# Patient Record
Sex: MALE | Race: WHITE | NOT HISPANIC OR LATINO | URBAN - METROPOLITAN AREA
[De-identification: names, ages, dates, MRNs, and addresses within clinical notes are randomized per-mention and may not be internally consistent; named-entity substitution may affect disease eponyms.]

---

## 2021-01-15 ENCOUNTER — OFFICE VISIT (OUTPATIENT)
Dept: INTERNAL MEDICINE | Facility: CLINIC | Age: 46
End: 2021-01-15
Payer: COMMERCIAL

## 2021-01-15 VITALS
BODY MASS INDEX: 32.07 KG/M2 | HEART RATE: 74 BPM | HEIGHT: 77 IN | WEIGHT: 271.63 LBS | OXYGEN SATURATION: 98 % | TEMPERATURE: 97 F | RESPIRATION RATE: 16 BRPM | SYSTOLIC BLOOD PRESSURE: 128 MMHG | DIASTOLIC BLOOD PRESSURE: 84 MMHG

## 2021-01-15 DIAGNOSIS — M25.542 ARTHRALGIA OF BOTH HANDS: ICD-10-CM

## 2021-01-15 DIAGNOSIS — J30.2 SEASONAL ALLERGIES: ICD-10-CM

## 2021-01-15 DIAGNOSIS — Z76.89 ESTABLISHING CARE WITH NEW DOCTOR, ENCOUNTER FOR: Primary | ICD-10-CM

## 2021-01-15 DIAGNOSIS — M79.642 PAIN IN BOTH HANDS: ICD-10-CM

## 2021-01-15 DIAGNOSIS — M79.641 PAIN IN BOTH HANDS: ICD-10-CM

## 2021-01-15 DIAGNOSIS — Z13.220 SCREENING FOR LIPID DISORDERS: ICD-10-CM

## 2021-01-15 DIAGNOSIS — Z12.5 SCREENING FOR PROSTATE CANCER: ICD-10-CM

## 2021-01-15 DIAGNOSIS — M25.541 ARTHRALGIA OF BOTH HANDS: ICD-10-CM

## 2021-01-15 PROCEDURE — 3008F BODY MASS INDEX DOCD: CPT | Mod: CPTII,S$GLB,, | Performed by: NURSE PRACTITIONER

## 2021-01-15 PROCEDURE — 1125F AMNT PAIN NOTED PAIN PRSNT: CPT | Mod: S$GLB,,, | Performed by: NURSE PRACTITIONER

## 2021-01-15 PROCEDURE — 3008F PR BODY MASS INDEX (BMI) DOCUMENTED: ICD-10-PCS | Mod: CPTII,S$GLB,, | Performed by: NURSE PRACTITIONER

## 2021-01-15 PROCEDURE — 99214 PR OFFICE/OUTPT VISIT, EST, LEVL IV, 30-39 MIN: ICD-10-PCS | Mod: S$GLB,,, | Performed by: NURSE PRACTITIONER

## 2021-01-15 PROCEDURE — 1125F PR PAIN SEVERITY QUANTIFIED, PAIN PRESENT: ICD-10-PCS | Mod: S$GLB,,, | Performed by: NURSE PRACTITIONER

## 2021-01-15 PROCEDURE — 99999 PR PBB SHADOW E&M-NEW PATIENT-LVL III: ICD-10-PCS | Mod: PBBFAC,,, | Performed by: NURSE PRACTITIONER

## 2021-01-15 PROCEDURE — 99214 OFFICE O/P EST MOD 30 MIN: CPT | Mod: S$GLB,,, | Performed by: NURSE PRACTITIONER

## 2021-01-15 PROCEDURE — 99999 PR PBB SHADOW E&M-NEW PATIENT-LVL III: CPT | Mod: PBBFAC,,, | Performed by: NURSE PRACTITIONER

## 2021-01-15 RX ORDER — MELOXICAM 15 MG/1
15 TABLET ORAL DAILY
Qty: 30 TABLET | Refills: 0 | Status: SHIPPED | OUTPATIENT
Start: 2021-01-15 | End: 2021-03-14 | Stop reason: SDUPTHER

## 2021-01-23 ENCOUNTER — LAB VISIT (OUTPATIENT)
Dept: LAB | Facility: HOSPITAL | Age: 46
End: 2021-01-23
Attending: NURSE PRACTITIONER
Payer: COMMERCIAL

## 2021-01-23 DIAGNOSIS — Z12.5 SCREENING FOR PROSTATE CANCER: ICD-10-CM

## 2021-01-23 DIAGNOSIS — Z13.220 SCREENING FOR LIPID DISORDERS: ICD-10-CM

## 2021-01-23 DIAGNOSIS — Z76.89 ESTABLISHING CARE WITH NEW DOCTOR, ENCOUNTER FOR: ICD-10-CM

## 2021-01-23 LAB
ALBUMIN SERPL BCP-MCNC: 4.2 G/DL (ref 3.5–5.2)
ALP SERPL-CCNC: 64 U/L (ref 55–135)
ALT SERPL W/O P-5'-P-CCNC: 29 U/L (ref 10–44)
ANION GAP SERPL CALC-SCNC: 7 MMOL/L (ref 8–16)
AST SERPL-CCNC: 28 U/L (ref 10–40)
BASOPHILS # BLD AUTO: 0.04 K/UL (ref 0–0.2)
BASOPHILS NFR BLD: 0.8 % (ref 0–1.9)
BILIRUB SERPL-MCNC: 0.5 MG/DL (ref 0.1–1)
BUN SERPL-MCNC: 22 MG/DL (ref 6–20)
CALCIUM SERPL-MCNC: 9.6 MG/DL (ref 8.7–10.5)
CHLORIDE SERPL-SCNC: 109 MMOL/L (ref 95–110)
CHOLEST SERPL-MCNC: 156 MG/DL (ref 120–199)
CHOLEST/HDLC SERPL: 2.9 {RATIO} (ref 2–5)
CO2 SERPL-SCNC: 27 MMOL/L (ref 23–29)
COMPLEXED PSA SERPL-MCNC: 0.28 NG/ML (ref 0–4)
CREAT SERPL-MCNC: 1.1 MG/DL (ref 0.5–1.4)
DIFFERENTIAL METHOD: NORMAL
EOSINOPHIL # BLD AUTO: 0.2 K/UL (ref 0–0.5)
EOSINOPHIL NFR BLD: 3.4 % (ref 0–8)
ERYTHROCYTE [DISTWIDTH] IN BLOOD BY AUTOMATED COUNT: 13.5 % (ref 11.5–14.5)
EST. GFR  (AFRICAN AMERICAN): >60 ML/MIN/1.73 M^2
EST. GFR  (NON AFRICAN AMERICAN): >60 ML/MIN/1.73 M^2
GLUCOSE SERPL-MCNC: 104 MG/DL (ref 70–110)
HCT VFR BLD AUTO: 45.4 % (ref 40–54)
HDLC SERPL-MCNC: 53 MG/DL (ref 40–75)
HDLC SERPL: 34 % (ref 20–50)
HGB BLD-MCNC: 14.6 G/DL (ref 14–18)
IMM GRANULOCYTES # BLD AUTO: 0.01 K/UL (ref 0–0.04)
IMM GRANULOCYTES NFR BLD AUTO: 0.2 % (ref 0–0.5)
LDLC SERPL CALC-MCNC: 92.4 MG/DL (ref 63–159)
LYMPHOCYTES # BLD AUTO: 1.1 K/UL (ref 1–4.8)
LYMPHOCYTES NFR BLD: 21.4 % (ref 18–48)
MCH RBC QN AUTO: 29.3 PG (ref 27–31)
MCHC RBC AUTO-ENTMCNC: 32.2 G/DL (ref 32–36)
MCV RBC AUTO: 91 FL (ref 82–98)
MONOCYTES # BLD AUTO: 0.4 K/UL (ref 0.3–1)
MONOCYTES NFR BLD: 8.9 % (ref 4–15)
NEUTROPHILS # BLD AUTO: 3.2 K/UL (ref 1.8–7.7)
NEUTROPHILS NFR BLD: 65.3 % (ref 38–73)
NONHDLC SERPL-MCNC: 103 MG/DL
NRBC BLD-RTO: 0 /100 WBC
PLATELET # BLD AUTO: 283 K/UL (ref 150–350)
PMV BLD AUTO: 10.2 FL (ref 9.2–12.9)
POTASSIUM SERPL-SCNC: 5 MMOL/L (ref 3.5–5.1)
PROT SERPL-MCNC: 7.3 G/DL (ref 6–8.4)
RBC # BLD AUTO: 4.99 M/UL (ref 4.6–6.2)
SODIUM SERPL-SCNC: 143 MMOL/L (ref 136–145)
T4 FREE SERPL-MCNC: 1.01 NG/DL (ref 0.71–1.51)
TRIGL SERPL-MCNC: 53 MG/DL (ref 30–150)
TSH SERPL DL<=0.005 MIU/L-ACNC: 0.83 UIU/ML (ref 0.4–4)
WBC # BLD AUTO: 4.95 K/UL (ref 3.9–12.7)

## 2021-01-23 PROCEDURE — 84153 ASSAY OF PSA TOTAL: CPT

## 2021-01-23 PROCEDURE — 84443 ASSAY THYROID STIM HORMONE: CPT

## 2021-01-23 PROCEDURE — 84439 ASSAY OF FREE THYROXINE: CPT

## 2021-01-23 PROCEDURE — 80053 COMPREHEN METABOLIC PANEL: CPT

## 2021-01-23 PROCEDURE — 36415 COLL VENOUS BLD VENIPUNCTURE: CPT | Mod: PO

## 2021-01-23 PROCEDURE — 85025 COMPLETE CBC W/AUTO DIFF WBC: CPT

## 2021-01-23 PROCEDURE — 80061 LIPID PANEL: CPT

## 2021-01-25 ENCOUNTER — PATIENT MESSAGE (OUTPATIENT)
Dept: INTERNAL MEDICINE | Facility: CLINIC | Age: 46
End: 2021-01-25

## 2021-04-13 ENCOUNTER — PATIENT MESSAGE (OUTPATIENT)
Dept: RESEARCH | Facility: HOSPITAL | Age: 46
End: 2021-04-13

## 2021-04-30 ENCOUNTER — OFFICE VISIT (OUTPATIENT)
Dept: INTERNAL MEDICINE | Facility: CLINIC | Age: 46
End: 2021-04-30
Payer: COMMERCIAL

## 2021-04-30 VITALS
HEIGHT: 77 IN | DIASTOLIC BLOOD PRESSURE: 80 MMHG | BODY MASS INDEX: 32.3 KG/M2 | OXYGEN SATURATION: 98 % | HEART RATE: 83 BPM | TEMPERATURE: 98 F | WEIGHT: 273.56 LBS | SYSTOLIC BLOOD PRESSURE: 120 MMHG

## 2021-04-30 DIAGNOSIS — M54.2 NECK PAIN: ICD-10-CM

## 2021-04-30 DIAGNOSIS — Z76.89 ENCOUNTER TO ESTABLISH CARE: ICD-10-CM

## 2021-04-30 DIAGNOSIS — Z00.00 ANNUAL PHYSICAL EXAM: Primary | ICD-10-CM

## 2021-04-30 PROCEDURE — 1126F PR PAIN SEVERITY QUANTIFIED, NO PAIN PRESENT: ICD-10-PCS | Mod: S$GLB,,, | Performed by: INTERNAL MEDICINE

## 2021-04-30 PROCEDURE — 3008F PR BODY MASS INDEX (BMI) DOCUMENTED: ICD-10-PCS | Mod: CPTII,S$GLB,, | Performed by: INTERNAL MEDICINE

## 2021-04-30 PROCEDURE — 99999 PR PBB SHADOW E&M-EST. PATIENT-LVL III: ICD-10-PCS | Mod: PBBFAC,,, | Performed by: INTERNAL MEDICINE

## 2021-04-30 PROCEDURE — 99999 PR PBB SHADOW E&M-EST. PATIENT-LVL III: CPT | Mod: PBBFAC,,, | Performed by: INTERNAL MEDICINE

## 2021-04-30 PROCEDURE — 3008F BODY MASS INDEX DOCD: CPT | Mod: CPTII,S$GLB,, | Performed by: INTERNAL MEDICINE

## 2021-04-30 PROCEDURE — 99396 PR PREVENTIVE VISIT,EST,40-64: ICD-10-PCS | Mod: S$GLB,,, | Performed by: INTERNAL MEDICINE

## 2021-04-30 PROCEDURE — 1126F AMNT PAIN NOTED NONE PRSNT: CPT | Mod: S$GLB,,, | Performed by: INTERNAL MEDICINE

## 2021-04-30 PROCEDURE — 99396 PREV VISIT EST AGE 40-64: CPT | Mod: S$GLB,,, | Performed by: INTERNAL MEDICINE

## 2021-04-30 RX ORDER — GABAPENTIN 600 MG/1
600 TABLET ORAL 3 TIMES DAILY PRN
Qty: 270 TABLET | Refills: 1 | Status: SHIPPED | OUTPATIENT
Start: 2021-04-30 | End: 2023-03-03 | Stop reason: SDUPTHER

## 2021-04-30 RX ORDER — NICOTINE 10 MG/ML
SPRAY, METERED NASAL
COMMUNITY
End: 2022-06-06 | Stop reason: SDUPTHER

## 2021-04-30 RX ORDER — TRIAMCINOLONE ACETONIDE 1 MG/G
CREAM TOPICAL
COMMUNITY

## 2021-04-30 RX ORDER — GABAPENTIN 600 MG/1
600 TABLET ORAL 3 TIMES DAILY PRN
COMMUNITY
End: 2021-04-30 | Stop reason: SDUPTHER

## 2021-10-02 DIAGNOSIS — M25.541 ARTHRALGIA OF BOTH HANDS: ICD-10-CM

## 2021-10-02 DIAGNOSIS — M79.642 PAIN IN BOTH HANDS: ICD-10-CM

## 2021-10-02 DIAGNOSIS — M25.542 ARTHRALGIA OF BOTH HANDS: ICD-10-CM

## 2021-10-02 DIAGNOSIS — M79.641 PAIN IN BOTH HANDS: ICD-10-CM

## 2021-10-04 DIAGNOSIS — M79.642 PAIN IN BOTH HANDS: ICD-10-CM

## 2021-10-04 DIAGNOSIS — M25.541 ARTHRALGIA OF BOTH HANDS: ICD-10-CM

## 2021-10-04 DIAGNOSIS — M25.542 ARTHRALGIA OF BOTH HANDS: ICD-10-CM

## 2021-10-04 DIAGNOSIS — M79.641 PAIN IN BOTH HANDS: ICD-10-CM

## 2021-10-04 RX ORDER — MELOXICAM 15 MG/1
15 TABLET ORAL DAILY
Qty: 30 TABLET | Refills: 0 | Status: SHIPPED | OUTPATIENT
Start: 2021-10-04 | End: 2021-10-04 | Stop reason: SDUPTHER

## 2021-10-05 RX ORDER — MELOXICAM 15 MG/1
15 TABLET ORAL DAILY
Qty: 30 TABLET | Refills: 0 | Status: SHIPPED | OUTPATIENT
Start: 2021-10-05 | End: 2022-01-10

## 2022-04-01 DIAGNOSIS — M79.641 PAIN IN BOTH HANDS: ICD-10-CM

## 2022-04-01 DIAGNOSIS — M25.542 ARTHRALGIA OF BOTH HANDS: ICD-10-CM

## 2022-04-01 DIAGNOSIS — M79.642 PAIN IN BOTH HANDS: ICD-10-CM

## 2022-04-01 DIAGNOSIS — M25.541 ARTHRALGIA OF BOTH HANDS: ICD-10-CM

## 2022-04-01 RX ORDER — MELOXICAM 15 MG/1
15 TABLET ORAL DAILY
Qty: 30 TABLET | Refills: 0 | Status: SHIPPED | OUTPATIENT
Start: 2022-04-01 | End: 2022-04-26

## 2022-04-12 ENCOUNTER — PATIENT MESSAGE (OUTPATIENT)
Dept: INTERNAL MEDICINE | Facility: CLINIC | Age: 47
End: 2022-04-12
Payer: COMMERCIAL

## 2022-04-26 DIAGNOSIS — M79.642 PAIN IN BOTH HANDS: ICD-10-CM

## 2022-04-26 DIAGNOSIS — M25.542 ARTHRALGIA OF BOTH HANDS: ICD-10-CM

## 2022-04-26 DIAGNOSIS — M25.541 ARTHRALGIA OF BOTH HANDS: ICD-10-CM

## 2022-04-26 DIAGNOSIS — M79.641 PAIN IN BOTH HANDS: ICD-10-CM

## 2022-04-26 RX ORDER — MELOXICAM 15 MG/1
15 TABLET ORAL DAILY PRN
Qty: 30 TABLET | Refills: 0 | Status: SHIPPED | OUTPATIENT
Start: 2022-04-26 | End: 2022-05-26

## 2022-05-26 DIAGNOSIS — M79.641 PAIN IN BOTH HANDS: ICD-10-CM

## 2022-05-26 DIAGNOSIS — M79.642 PAIN IN BOTH HANDS: ICD-10-CM

## 2022-05-26 DIAGNOSIS — M25.541 ARTHRALGIA OF BOTH HANDS: ICD-10-CM

## 2022-05-26 DIAGNOSIS — M25.542 ARTHRALGIA OF BOTH HANDS: ICD-10-CM

## 2022-05-26 RX ORDER — MELOXICAM 15 MG/1
TABLET ORAL
Qty: 30 TABLET | Refills: 0 | Status: SHIPPED | OUTPATIENT
Start: 2022-05-26 | End: 2022-07-19

## 2022-06-06 ENCOUNTER — OFFICE VISIT (OUTPATIENT)
Dept: INTERNAL MEDICINE | Facility: CLINIC | Age: 47
End: 2022-06-06
Payer: COMMERCIAL

## 2022-06-06 ENCOUNTER — LAB VISIT (OUTPATIENT)
Dept: LAB | Facility: HOSPITAL | Age: 47
End: 2022-06-06
Attending: NURSE PRACTITIONER
Payer: COMMERCIAL

## 2022-06-06 VITALS
SYSTOLIC BLOOD PRESSURE: 138 MMHG | TEMPERATURE: 98 F | HEART RATE: 69 BPM | DIASTOLIC BLOOD PRESSURE: 86 MMHG | OXYGEN SATURATION: 98 % | BODY MASS INDEX: 32.52 KG/M2 | WEIGHT: 274.25 LBS

## 2022-06-06 DIAGNOSIS — Z13.220 ENCOUNTER FOR LIPID SCREENING FOR CARDIOVASCULAR DISEASE: ICD-10-CM

## 2022-06-06 DIAGNOSIS — Z13.6 ENCOUNTER FOR LIPID SCREENING FOR CARDIOVASCULAR DISEASE: ICD-10-CM

## 2022-06-06 DIAGNOSIS — J30.2 SEASONAL ALLERGIES: ICD-10-CM

## 2022-06-06 DIAGNOSIS — Z11.59 ENCOUNTER FOR HEPATITIS C VIRUS SCREENING TEST FOR HIGH RISK PATIENT: ICD-10-CM

## 2022-06-06 DIAGNOSIS — M54.31 SCIATICA OF RIGHT SIDE: ICD-10-CM

## 2022-06-06 DIAGNOSIS — Z00.00 ANNUAL PHYSICAL EXAM: Primary | ICD-10-CM

## 2022-06-06 DIAGNOSIS — Z12.5 SCREENING FOR PROSTATE CANCER: ICD-10-CM

## 2022-06-06 DIAGNOSIS — M54.2 NECK PAIN: ICD-10-CM

## 2022-06-06 DIAGNOSIS — Z91.89 ENCOUNTER FOR HEPATITIS C VIRUS SCREENING TEST FOR HIGH RISK PATIENT: ICD-10-CM

## 2022-06-06 DIAGNOSIS — Z00.00 ANNUAL PHYSICAL EXAM: ICD-10-CM

## 2022-06-06 DIAGNOSIS — Z11.59 ENCOUNTER FOR HEPATITIS C SCREENING TEST FOR LOW RISK PATIENT: ICD-10-CM

## 2022-06-06 LAB
ALBUMIN SERPL BCP-MCNC: 4.3 G/DL (ref 3.5–5.2)
ALP SERPL-CCNC: 64 U/L (ref 55–135)
ALT SERPL W/O P-5'-P-CCNC: 22 U/L (ref 10–44)
ANION GAP SERPL CALC-SCNC: 9 MMOL/L (ref 8–16)
AST SERPL-CCNC: 20 U/L (ref 10–40)
BASOPHILS # BLD AUTO: 0.04 K/UL (ref 0–0.2)
BASOPHILS NFR BLD: 0.8 % (ref 0–1.9)
BILIRUB SERPL-MCNC: 0.6 MG/DL (ref 0.1–1)
BUN SERPL-MCNC: 16 MG/DL (ref 6–20)
CALCIUM SERPL-MCNC: 9.5 MG/DL (ref 8.7–10.5)
CHLORIDE SERPL-SCNC: 106 MMOL/L (ref 95–110)
CHOLEST SERPL-MCNC: 160 MG/DL (ref 120–199)
CHOLEST/HDLC SERPL: 3.1 {RATIO} (ref 2–5)
CO2 SERPL-SCNC: 26 MMOL/L (ref 23–29)
COMPLEXED PSA SERPL-MCNC: 0.46 NG/ML (ref 0–4)
CREAT SERPL-MCNC: 1 MG/DL (ref 0.5–1.4)
DIFFERENTIAL METHOD: NORMAL
EOSINOPHIL # BLD AUTO: 0.1 K/UL (ref 0–0.5)
EOSINOPHIL NFR BLD: 2 % (ref 0–8)
ERYTHROCYTE [DISTWIDTH] IN BLOOD BY AUTOMATED COUNT: 13.3 % (ref 11.5–14.5)
EST. GFR  (AFRICAN AMERICAN): >60 ML/MIN/1.73 M^2
EST. GFR  (NON AFRICAN AMERICAN): >60 ML/MIN/1.73 M^2
GLUCOSE SERPL-MCNC: 92 MG/DL (ref 70–110)
HCT VFR BLD AUTO: 44.1 % (ref 40–54)
HDLC SERPL-MCNC: 51 MG/DL (ref 40–75)
HDLC SERPL: 31.9 % (ref 20–50)
HGB BLD-MCNC: 14.9 G/DL (ref 14–18)
IMM GRANULOCYTES # BLD AUTO: 0.01 K/UL (ref 0–0.04)
IMM GRANULOCYTES NFR BLD AUTO: 0.2 % (ref 0–0.5)
LDLC SERPL CALC-MCNC: 96.8 MG/DL (ref 63–159)
LYMPHOCYTES # BLD AUTO: 1.3 K/UL (ref 1–4.8)
LYMPHOCYTES NFR BLD: 27.3 % (ref 18–48)
MCH RBC QN AUTO: 29.2 PG (ref 27–31)
MCHC RBC AUTO-ENTMCNC: 33.8 G/DL (ref 32–36)
MCV RBC AUTO: 87 FL (ref 82–98)
MONOCYTES # BLD AUTO: 0.4 K/UL (ref 0.3–1)
MONOCYTES NFR BLD: 7.2 % (ref 4–15)
NEUTROPHILS # BLD AUTO: 3.1 K/UL (ref 1.8–7.7)
NEUTROPHILS NFR BLD: 62.5 % (ref 38–73)
NONHDLC SERPL-MCNC: 109 MG/DL
NRBC BLD-RTO: 0 /100 WBC
PLATELET # BLD AUTO: 255 K/UL (ref 150–450)
PMV BLD AUTO: 10.2 FL (ref 9.2–12.9)
POTASSIUM SERPL-SCNC: 4.4 MMOL/L (ref 3.5–5.1)
PROT SERPL-MCNC: 7.5 G/DL (ref 6–8.4)
RBC # BLD AUTO: 5.1 M/UL (ref 4.6–6.2)
SODIUM SERPL-SCNC: 141 MMOL/L (ref 136–145)
TRIGL SERPL-MCNC: 61 MG/DL (ref 30–150)
TSH SERPL DL<=0.005 MIU/L-ACNC: 0.98 UIU/ML (ref 0.4–4)
WBC # BLD AUTO: 4.88 K/UL (ref 3.9–12.7)

## 2022-06-06 PROCEDURE — 3008F PR BODY MASS INDEX (BMI) DOCUMENTED: ICD-10-PCS | Mod: CPTII,S$GLB,, | Performed by: NURSE PRACTITIONER

## 2022-06-06 PROCEDURE — 80061 LIPID PANEL: CPT | Performed by: NURSE PRACTITIONER

## 2022-06-06 PROCEDURE — 85025 COMPLETE CBC W/AUTO DIFF WBC: CPT | Performed by: NURSE PRACTITIONER

## 2022-06-06 PROCEDURE — 3075F PR MOST RECENT SYSTOLIC BLOOD PRESS GE 130-139MM HG: ICD-10-PCS | Mod: CPTII,S$GLB,, | Performed by: NURSE PRACTITIONER

## 2022-06-06 PROCEDURE — 84153 ASSAY OF PSA TOTAL: CPT | Performed by: NURSE PRACTITIONER

## 2022-06-06 PROCEDURE — 99396 PR PREVENTIVE VISIT,EST,40-64: ICD-10-PCS | Mod: S$GLB,,, | Performed by: NURSE PRACTITIONER

## 2022-06-06 PROCEDURE — 36415 COLL VENOUS BLD VENIPUNCTURE: CPT | Performed by: NURSE PRACTITIONER

## 2022-06-06 PROCEDURE — 99999 PR PBB SHADOW E&M-EST. PATIENT-LVL III: CPT | Mod: PBBFAC,,, | Performed by: NURSE PRACTITIONER

## 2022-06-06 PROCEDURE — 3075F SYST BP GE 130 - 139MM HG: CPT | Mod: CPTII,S$GLB,, | Performed by: NURSE PRACTITIONER

## 2022-06-06 PROCEDURE — 1159F PR MEDICATION LIST DOCUMENTED IN MEDICAL RECORD: ICD-10-PCS | Mod: CPTII,S$GLB,, | Performed by: NURSE PRACTITIONER

## 2022-06-06 PROCEDURE — 86803 HEPATITIS C AB TEST: CPT | Performed by: NURSE PRACTITIONER

## 2022-06-06 PROCEDURE — 1159F MED LIST DOCD IN RCRD: CPT | Mod: CPTII,S$GLB,, | Performed by: NURSE PRACTITIONER

## 2022-06-06 PROCEDURE — 3079F DIAST BP 80-89 MM HG: CPT | Mod: CPTII,S$GLB,, | Performed by: NURSE PRACTITIONER

## 2022-06-06 PROCEDURE — 3008F BODY MASS INDEX DOCD: CPT | Mod: CPTII,S$GLB,, | Performed by: NURSE PRACTITIONER

## 2022-06-06 PROCEDURE — 1160F RVW MEDS BY RX/DR IN RCRD: CPT | Mod: CPTII,S$GLB,, | Performed by: NURSE PRACTITIONER

## 2022-06-06 PROCEDURE — 99999 PR PBB SHADOW E&M-EST. PATIENT-LVL III: ICD-10-PCS | Mod: PBBFAC,,, | Performed by: NURSE PRACTITIONER

## 2022-06-06 PROCEDURE — 80053 COMPREHEN METABOLIC PANEL: CPT | Performed by: NURSE PRACTITIONER

## 2022-06-06 PROCEDURE — 99396 PREV VISIT EST AGE 40-64: CPT | Mod: S$GLB,,, | Performed by: NURSE PRACTITIONER

## 2022-06-06 PROCEDURE — 84443 ASSAY THYROID STIM HORMONE: CPT | Performed by: NURSE PRACTITIONER

## 2022-06-06 PROCEDURE — 3079F PR MOST RECENT DIASTOLIC BLOOD PRESSURE 80-89 MM HG: ICD-10-PCS | Mod: CPTII,S$GLB,, | Performed by: NURSE PRACTITIONER

## 2022-06-06 PROCEDURE — 1160F PR REVIEW ALL MEDS BY PRESCRIBER/CLIN PHARMACIST DOCUMENTED: ICD-10-PCS | Mod: CPTII,S$GLB,, | Performed by: NURSE PRACTITIONER

## 2022-06-06 RX ORDER — METHYLPREDNISOLONE 4 MG/1
TABLET ORAL
Qty: 21 EACH | Refills: 0 | Status: SHIPPED | OUTPATIENT
Start: 2022-06-06 | End: 2022-06-27

## 2022-06-06 RX ORDER — NICOTINE 10 MG/ML
1 SPRAY, METERED NASAL
Qty: 10 ML | Refills: 5 | Status: SHIPPED | OUTPATIENT
Start: 2022-06-06 | End: 2023-06-13

## 2022-06-06 NOTE — PROGRESS NOTES
[facility] Note    Chief Complaint      Chief Complaint   Patient presents with    Annual Exam     History of Present Illness      Last Denton is a 46 y.o. male patient of Dr. Sweet who presents today for annual visit exam. Pt feeling well, no complaints, denies sob or cp. Reviewed meds and history with pt. No concerns for bowel or bladder function, stays hydrated and active, regular diet.  Comes to appt alone  Practices safety measures such as wearing seatbelt    Neck and low back pain-chronic, takes gabapentin and meloxicam. Will get steroid pk for flares and stop the meloxicam.     Pt aware of hep c and would like options for treatment. 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      Labs- ordered  Eye exams-glasses    Vaccines:  covid- declines  Flu shot- declines  Tdap-2017            Health Maintenance   Topic Date Due    Hepatitis C Screening  Never done    Lipid Panel  01/23/2026    TETANUS VACCINE  01/01/2027       Past Medical History:   Diagnosis Date    Childhood asthma     History of positive hepatitis C        Past Surgical History:   Procedure Laterality Date    carpel tunnel         family history includes Breast cancer in his mother; COPD in his father and mother; Cancer in his mother; Emphysema in his father and mother; Hypertension in his father; Seizures in his father.     Social History     Tobacco Use    Smoking status: Former Smoker     Packs/day: 0.00     Years: 0.00     Pack years: 0.00     Types: Cigarettes    Smokeless tobacco: Former User   Substance Use Topics    Alcohol use: Never    Drug use: Never       Review of Systems   Constitutional: Negative for chills and fever.   HENT: Negative for congestion, hearing loss, sinus pain and sore throat.    Eyes: Negative for blurred vision and discharge.   Respiratory: Negative for cough, shortness of breath and wheezing.    Cardiovascular: Negative for chest pain, palpitations and leg swelling.   Gastrointestinal:  Negative for abdominal pain, blood in stool, constipation, diarrhea, nausea and vomiting.   Genitourinary: Negative for dysuria, hematuria and urgency.   Musculoskeletal: Positive for back pain, joint pain and neck pain. Negative for myalgias.   Skin: Negative for rash.   Neurological: Negative for dizziness, weakness and headaches.   Endo/Heme/Allergies: Negative for polydipsia.   Psychiatric/Behavioral: Negative for depression. The patient is not nervous/anxious.         Outpatient Encounter Medications as of 6/6/2022   Medication Sig Dispense Refill    gabapentin (NEURONTIN) 600 MG tablet Take 1 tablet (600 mg total) by mouth 3 (three) times daily as needed (pain). 270 tablet 1    meloxicam (MOBIC) 15 MG tablet TAKE 1 TABLET BY MOUTH EVERY DAY AS NEEDED FOR PAIN 30 tablet 0    triamcinolone acetonide 0.1% (KENALOG) 0.1 % cream triamcinolone acetonide 0.1 % topical cream   APPLY A THIN LAYER TO THE AFFECTED AREA(S) BY TOPICAL ROUTE 2 TIMES PER DAY      [DISCONTINUED] nicotine (NICOTROL NS) 10 mg/mL Spry Nicotrol NS 10 mg/mL nasal spray   TAKE 1 SPRAY EACH NOSTRIL 1-5 TIMES/HR X 8 WEEKS. THEN TAPER DOSE TO D/C; START 1 SPRAY EA NOSTRIL 1-2 TIMES/HOUR, MAX 10 SPRAYS /HOUR, NOT TO EXCEED 80 SPRAYS/DAY      methylPREDNISolone (MEDROL DOSEPACK) 4 mg tablet use as directed 21 each 0    nicotine (NICOTROL NS) 10 mg/mL Spry 1 mg by Each Nostril route 5 (five) times daily. 10 mL 5     No facility-administered encounter medications on file as of 6/6/2022.       Review of patient's allergies indicates:   Allergen Reactions    Influenza virus vaccines Swelling     Throat Swelling       Physical Exam      Vital Signs  Temp: 97.9 °F (36.6 °C)  Pulse: 69  SpO2: 98 %  BP: 138/86  Pain Score: 0-No pain  Height and Weight  Weight: 124.4 kg (274 lb 4 oz)    Physical Exam  Vitals and nursing note reviewed.   Constitutional:       General: He is not in acute distress.     Appearance: Normal appearance. He is well-developed. He  is not ill-appearing.   HENT:      Head: Normocephalic and atraumatic.      Right Ear: External ear normal.      Left Ear: External ear normal.   Eyes:      Conjunctiva/sclera: Conjunctivae normal.      Pupils: Pupils are equal, round, and reactive to light.   Neck:      Thyroid: No thyromegaly.      Vascular: No JVD.      Trachea: No tracheal deviation.   Cardiovascular:      Rate and Rhythm: Normal rate and regular rhythm.      Heart sounds: Normal heart sounds.   Pulmonary:      Effort: Pulmonary effort is normal. No respiratory distress.      Breath sounds: Normal breath sounds.   Abdominal:      General: Bowel sounds are normal. There is no distension.      Palpations: Abdomen is soft.      Tenderness: There is no abdominal tenderness.   Musculoskeletal:         General: Normal range of motion.      Cervical back: Normal range of motion and neck supple.   Lymphadenopathy:      Cervical: No cervical adenopathy.   Skin:     General: Skin is warm and dry.      Coloration: Skin is not pale.      Findings: No erythema or rash.   Neurological:      Mental Status: He is alert and oriented to person, place, and time.   Psychiatric:         Behavior: Behavior normal.         Thought Content: Thought content normal.         Judgment: Judgment normal.          Laboratory:  CBC:  Lab Results   Component Value Date    WBC 4.95 01/23/2021    RBC 4.99 01/23/2021    HGB 14.6 01/23/2021    HCT 45.4 01/23/2021     01/23/2021    MCV 91 01/23/2021    MCH 29.3 01/23/2021    MCHC 32.2 01/23/2021     CMP:  Lab Results   Component Value Date     01/23/2021    CALCIUM 9.6 01/23/2021    ALBUMIN 4.2 01/23/2021    PROT 7.3 01/23/2021     01/23/2021    K 5.0 01/23/2021    CO2 27 01/23/2021     01/23/2021    BUN 22 (H) 01/23/2021    ALKPHOS 64 01/23/2021    ALT 29 01/23/2021    AST 28 01/23/2021    BILITOT 0.5 01/23/2021     URINALYSIS:  No results found for: COLORU, CLARITYU, SPECGRAV, PHUR, PROTEINUA, GLUCOSEU,  BILIRUBINCON, BLOODU, WBCU, RBCU, BACTERIA, MUCUS, NITRITE, LEUKOCYTESUR, UROBILINOGEN, HYALINECASTS   LIPIDS:  Lab Results   Component Value Date    TSH 0.830 01/23/2021    HDL 53 01/23/2021    CHOL 156 01/23/2021    TRIG 53 01/23/2021    LDLCALC 92.4 01/23/2021    CHOLHDL 34.0 01/23/2021    NONHDLCHOL 103 01/23/2021    TOTALCHOLEST 2.9 01/23/2021     TSH:  Lab Results   Component Value Date    TSH 0.830 01/23/2021     A1C:  No results found for: HGBA1C      Assessment/Plan     Last Denton is a 46 y.o.male with:    Annual physical exam  -     PSA, Screening; Future; Expected date: 06/06/2022  -     CBC Auto Differential; Future; Expected date: 06/06/2022  -     Comprehensive Metabolic Panel; Future; Expected date: 06/06/2022  -     Lipid Panel; Future; Expected date: 06/06/2022  -     TSH; Future; Expected date: 06/06/2022    Seasonal allergies    Sciatica of right side  -     methylPREDNISolone (MEDROL DOSEPACK) 4 mg tablet; use as directed  Dispense: 21 each; Refill: 0  Pt knows to stop meloxicam while taking this    Neck pain  Stable with gabapentin    Screening for prostate cancer  -     PSA, Screening; Future; Expected date: 06/06/2022  -     CBC Auto Differential; Future; Expected date: 06/06/2022  -     Comprehensive Metabolic Panel; Future; Expected date: 06/06/2022  -     Lipid Panel; Future; Expected date: 06/06/2022  -     TSH; Future; Expected date: 06/06/2022    Encounter for lipid screening for cardiovascular disease  -     PSA, Screening; Future; Expected date: 06/06/2022  -     CBC Auto Differential; Future; Expected date: 06/06/2022  -     Comprehensive Metabolic Panel; Future; Expected date: 06/06/2022  -     Lipid Panel; Future; Expected date: 06/06/2022  -     TSH; Future; Expected date: 06/06/2022    Encounter for hepatitis C virus screening test for high risk patient  -     Hepatitis C Antibody; Future; Expected date: 06/06/2022    Other orders  -     nicotine (NICOTROL NS) 10 mg/mL Spry;  1 mg by Each Nostril route 5 (five) times daily.  Dispense: 10 mL; Refill: 5         I spent 40 minutes on the day of this encounter for preparing for, evaluating, treating, and managing this patient.        -Continue current medications and maintain follow up with specialists.  Return to clinic in 1 year or sooner for any concerns   No follow-ups on file.      Erin Jiminez, NP-C [facility] [place]

## 2022-06-07 LAB — HCV AB SERPL QL IA: POSITIVE

## 2022-06-10 DIAGNOSIS — R76.8 HEPATITIS C ANTIBODY POSITIVE IN BLOOD: Primary | ICD-10-CM

## 2022-06-13 ENCOUNTER — TELEPHONE (OUTPATIENT)
Dept: HEPATOLOGY | Facility: CLINIC | Age: 47
End: 2022-06-13
Payer: COMMERCIAL

## 2022-06-13 NOTE — TELEPHONE ENCOUNTER
Bella Person NP ordered that patient be scheduled for hepatology consult visit.  Patient hep c antibody +.  No quant noted.  Appt with PA Scheuermann scheduled 6/30/22; appt reminder notice mailed.

## 2022-06-13 NOTE — TELEPHONE ENCOUNTER
----- Message from Donnell Juan sent at 6/13/2022  3:03 PM CDT -----    ----- Message -----  From: Ban Lagunas  Sent: 6/13/2022   3:00 PM CDT  To: [facility] Liver Referral Pool    Bella Person NP referred pt. To hepatology. While scheduling I received a hard stop. Please reach out to pt. Thanks      Diagnosis: Hepatitis C antibody positive in blood [R76.8]

## 2022-06-30 ENCOUNTER — LAB VISIT (OUTPATIENT)
Dept: LAB | Facility: HOSPITAL | Age: 47
End: 2022-06-30
Payer: COMMERCIAL

## 2022-06-30 ENCOUNTER — OFFICE VISIT (OUTPATIENT)
Dept: HEPATOLOGY | Facility: CLINIC | Age: 47
End: 2022-06-30
Payer: COMMERCIAL

## 2022-06-30 VITALS
SYSTOLIC BLOOD PRESSURE: 140 MMHG | BODY MASS INDEX: 31.94 KG/M2 | RESPIRATION RATE: 18 BRPM | DIASTOLIC BLOOD PRESSURE: 77 MMHG | HEART RATE: 61 BPM | TEMPERATURE: 98 F | HEIGHT: 77 IN | OXYGEN SATURATION: 99 % | WEIGHT: 270.5 LBS

## 2022-06-30 DIAGNOSIS — R76.8 HEPATITIS C ANTIBODY POSITIVE IN BLOOD: ICD-10-CM

## 2022-06-30 LAB
HAV IGG SER QL IA: POSITIVE
HBV CORE AB SERPL QL IA: NEGATIVE
HBV SURFACE AG SERPL QL IA: NEGATIVE
HIV 1+2 AB+HIV1 P24 AG SERPL QL IA: NEGATIVE

## 2022-06-30 PROCEDURE — 1159F PR MEDICATION LIST DOCUMENTED IN MEDICAL RECORD: ICD-10-PCS | Mod: CPTII,S$GLB,, | Performed by: PHYSICIAN ASSISTANT

## 2022-06-30 PROCEDURE — 3008F BODY MASS INDEX DOCD: CPT | Mod: CPTII,S$GLB,, | Performed by: PHYSICIAN ASSISTANT

## 2022-06-30 PROCEDURE — 99999 PR PBB SHADOW E&M-EST. PATIENT-LVL IV: CPT | Mod: PBBFAC,,, | Performed by: PHYSICIAN ASSISTANT

## 2022-06-30 PROCEDURE — 99214 OFFICE O/P EST MOD 30 MIN: CPT | Mod: S$GLB,,, | Performed by: PHYSICIAN ASSISTANT

## 2022-06-30 PROCEDURE — 1160F RVW MEDS BY RX/DR IN RCRD: CPT | Mod: CPTII,S$GLB,, | Performed by: PHYSICIAN ASSISTANT

## 2022-06-30 PROCEDURE — 86704 HEP B CORE ANTIBODY TOTAL: CPT | Performed by: PHYSICIAN ASSISTANT

## 2022-06-30 PROCEDURE — 1159F MED LIST DOCD IN RCRD: CPT | Mod: CPTII,S$GLB,, | Performed by: PHYSICIAN ASSISTANT

## 2022-06-30 PROCEDURE — 36415 COLL VENOUS BLD VENIPUNCTURE: CPT | Performed by: PHYSICIAN ASSISTANT

## 2022-06-30 PROCEDURE — 3078F DIAST BP <80 MM HG: CPT | Mod: CPTII,S$GLB,, | Performed by: PHYSICIAN ASSISTANT

## 2022-06-30 PROCEDURE — 87340 HEPATITIS B SURFACE AG IA: CPT | Performed by: PHYSICIAN ASSISTANT

## 2022-06-30 PROCEDURE — 3077F SYST BP >= 140 MM HG: CPT | Mod: CPTII,S$GLB,, | Performed by: PHYSICIAN ASSISTANT

## 2022-06-30 PROCEDURE — 99999 PR PBB SHADOW E&M-EST. PATIENT-LVL IV: ICD-10-PCS | Mod: PBBFAC,,, | Performed by: PHYSICIAN ASSISTANT

## 2022-06-30 PROCEDURE — 99214 PR OFFICE/OUTPT VISIT, EST, LEVL IV, 30-39 MIN: ICD-10-PCS | Mod: S$GLB,,, | Performed by: PHYSICIAN ASSISTANT

## 2022-06-30 PROCEDURE — 86790 VIRUS ANTIBODY NOS: CPT | Performed by: PHYSICIAN ASSISTANT

## 2022-06-30 PROCEDURE — 3008F PR BODY MASS INDEX (BMI) DOCUMENTED: ICD-10-PCS | Mod: CPTII,S$GLB,, | Performed by: PHYSICIAN ASSISTANT

## 2022-06-30 PROCEDURE — 1160F PR REVIEW ALL MEDS BY PRESCRIBER/CLIN PHARMACIST DOCUMENTED: ICD-10-PCS | Mod: CPTII,S$GLB,, | Performed by: PHYSICIAN ASSISTANT

## 2022-06-30 PROCEDURE — 3077F PR MOST RECENT SYSTOLIC BLOOD PRESSURE >= 140 MM HG: ICD-10-PCS | Mod: CPTII,S$GLB,, | Performed by: PHYSICIAN ASSISTANT

## 2022-06-30 PROCEDURE — 86706 HEP B SURFACE ANTIBODY: CPT | Performed by: PHYSICIAN ASSISTANT

## 2022-06-30 PROCEDURE — 3078F PR MOST RECENT DIASTOLIC BLOOD PRESSURE < 80 MM HG: ICD-10-PCS | Mod: CPTII,S$GLB,, | Performed by: PHYSICIAN ASSISTANT

## 2022-06-30 PROCEDURE — 87389 HIV-1 AG W/HIV-1&-2 AB AG IA: CPT | Performed by: PHYSICIAN ASSISTANT

## 2022-06-30 RX ORDER — IPRATROPIUM BROMIDE 42 UG/1
SPRAY, METERED NASAL
COMMUNITY
Start: 2022-04-14

## 2022-06-30 NOTE — PROGRESS NOTES
"HEPATOLOGY CLINIC VISIT NOTE - HCV clinic  REFERRING PROVIDER: Lisa Sam MD  CHIEF COMPLAINT: Hepatitis C       HISTORY       This is a 46 y.o. White male referred for HCV    HCV history:  Per Epic:  6/6/22 - (+) HCVAB  Has not had HCV RNA  LFT normal    First told of HCV in 2006 while incarcerated   Reports he was told virus was "not detected"    Risks for HCV:  Blood transfusion - no    IVDA - yes, last use18 yrs ago    Tattoos - yes, some placed while incarcerated    Prior incarceration - yes, [place]     Liver staging:  No formal liver staging   No liver imaging   Labs reveal no obvious evidence of advanced fibrosis; well preserved liver function    Feels well  Denies jaundice, dark urine, abdominal distention, hematemesis, melena, slowed mentation.     PMH, PSH, SOCIAL HX, FAMILY HX      Reviewed in Epic  Pertinent findings:  FAMILY HX: Fly hx of HCV w/ HCC in father; brother w/ HCV  SOCIAL HX: , works as   Alcohol - no  Drugs - prior use, now runs several drug rehab meetings      ROS:   Review of Systems   Respiratory: Negative for shortness of breath.    Cardiovascular: Negative for chest pain and leg swelling.   Gastrointestinal: Negative for abdominal pain.   Skin: Negative for rash.   Psychiatric/Behavioral: Negative for memory loss.         PHYSICAL EXAM:  Friendly White male, in no acute distress; alert and oriented to person, place and time  VITALS: reviewed  HEENT: Sclerae anicteric.   ABDOMEN: Flat, soft, nontender.   SKIN: Warm and dry. No jaundice, No obvious rashes. Numerous tatoos  EXTREMITIES: No lower extremity edema  NEURO/PSYCH: Normal gate. Memory intact. Thought and speech pattern appropriate. Behavior normal. No depression or anxiety noted.    PERTINENT DIAGNOSTIC RESULTS      Lab Results   Component Value Date    WBC 4.88 06/06/2022    HGB 14.9 06/06/2022     06/06/2022     No results found for: INR  Lab Results   Component Value Date    AST 20 06/06/2022    ALT " 22 06/06/2022    BILITOT 0.6 06/06/2022    ALBUMIN 4.3 06/06/2022    ALKPHOS 64 06/06/2022    CREATININE 1.0 06/06/2022    BUN 16 06/06/2022     06/06/2022    K 4.4 06/06/2022         ASSESSMENT        46 y.o. White male with:  1. POSITIVE HEPATITIS C ANTIBODY  Appears per hx pt has been previously infected and cleared on his own. Will repeat HCV RNA one last time to confirm.    2. WORKS AS   -- recalls prior hepatitis vaccines, will confirm immunity    PLAN        1. Schedule labs  Orders Placed This Encounter   Procedures    Hepatitis B Surface Antigen    Hepatitis B Surface Ab, Qualitative    Hepatitis B Core Antibody, Total    Hepatitis A antibody, IgG    HIV 1/2 Ag/Ab (4th Gen)    Hepatitis C Genotype     -- If HCV RNA negative, no further evaluation would be needed      ____________________________________________________________________________  EDUCATION:  Discussed significance of HCV antibody, indicating prior exposure to HCV. Discussed need for additional labs to see if pt has cleared virus on own or if still viremic, in which case further evaluation and antiviral therapy would be needed. Discussed that HCV antibody is not protective and reinfection could occur.    __________________________________________________________________    Duration of encounter:  31 min  This includes face-to-face time and non face-to-face time preparing to see the patient (eg, review of tests), obtaining and/or reviewing separately obtained history, documenting clinical information in the electronic or other health record, independently interpreting resultsand communicating results to the patient/family/caregiver, or care coordination.

## 2022-07-01 LAB — HBV SURFACE AB SER-ACNC: NEGATIVE M[IU]/ML

## 2022-07-05 ENCOUNTER — TELEPHONE (OUTPATIENT)
Dept: HEPATOLOGY | Facility: CLINIC | Age: 47
End: 2022-07-05
Payer: COMMERCIAL

## 2022-07-05 LAB
HCV GENTYP SERPL NAA+PROBE: NORMAL
HCV RNA SERPL NAA+PROBE-LOG IU: <1.08 LOG (10) IU/ML
HCV RNA SERPL QL NAA+PROBE: NOT DETECTED
HCV RNA SPEC NAA+PROBE-ACNC: <12 IU/ML

## 2022-10-04 DIAGNOSIS — M25.541 ARTHRALGIA OF BOTH HANDS: ICD-10-CM

## 2022-10-04 DIAGNOSIS — M79.641 PAIN IN BOTH HANDS: ICD-10-CM

## 2022-10-04 DIAGNOSIS — M25.542 ARTHRALGIA OF BOTH HANDS: ICD-10-CM

## 2022-10-04 DIAGNOSIS — M79.642 PAIN IN BOTH HANDS: ICD-10-CM

## 2022-10-04 RX ORDER — MELOXICAM 15 MG/1
TABLET ORAL
Qty: 30 TABLET | Refills: 0 | Status: SHIPPED | OUTPATIENT
Start: 2022-10-04 | End: 2022-11-08

## 2022-11-08 DIAGNOSIS — M79.641 PAIN IN BOTH HANDS: ICD-10-CM

## 2022-11-08 DIAGNOSIS — M79.642 PAIN IN BOTH HANDS: ICD-10-CM

## 2022-11-08 DIAGNOSIS — M25.542 ARTHRALGIA OF BOTH HANDS: ICD-10-CM

## 2022-11-08 DIAGNOSIS — M25.541 ARTHRALGIA OF BOTH HANDS: ICD-10-CM

## 2022-11-08 RX ORDER — MELOXICAM 15 MG/1
TABLET ORAL
Qty: 30 TABLET | Refills: 0 | Status: SHIPPED | OUTPATIENT
Start: 2022-11-08 | End: 2022-12-07

## 2022-11-16 ENCOUNTER — PATIENT MESSAGE (OUTPATIENT)
Dept: INTERNAL MEDICINE | Facility: CLINIC | Age: 47
End: 2022-11-16
Payer: COMMERCIAL

## 2022-11-16 DIAGNOSIS — N52.9 ERECTILE DYSFUNCTION, UNSPECIFIED ERECTILE DYSFUNCTION TYPE: Primary | ICD-10-CM

## 2022-11-17 ENCOUNTER — OFFICE VISIT (OUTPATIENT)
Dept: UROLOGY | Facility: CLINIC | Age: 47
End: 2022-11-17
Payer: COMMERCIAL

## 2022-11-17 VITALS
WEIGHT: 270 LBS | DIASTOLIC BLOOD PRESSURE: 84 MMHG | HEIGHT: 77 IN | BODY MASS INDEX: 31.88 KG/M2 | HEART RATE: 72 BPM | SYSTOLIC BLOOD PRESSURE: 119 MMHG

## 2022-11-17 DIAGNOSIS — N52.8 OTHER MALE ERECTILE DYSFUNCTION: ICD-10-CM

## 2022-11-17 PROCEDURE — 3079F DIAST BP 80-89 MM HG: CPT | Mod: CPTII,S$GLB,, | Performed by: UROLOGY

## 2022-11-17 PROCEDURE — 3079F PR MOST RECENT DIASTOLIC BLOOD PRESSURE 80-89 MM HG: ICD-10-PCS | Mod: CPTII,S$GLB,, | Performed by: UROLOGY

## 2022-11-17 PROCEDURE — 1159F MED LIST DOCD IN RCRD: CPT | Mod: CPTII,S$GLB,, | Performed by: UROLOGY

## 2022-11-17 PROCEDURE — 3008F BODY MASS INDEX DOCD: CPT | Mod: CPTII,S$GLB,, | Performed by: UROLOGY

## 2022-11-17 PROCEDURE — 99999 PR PBB SHADOW E&M-EST. PATIENT-LVL IV: CPT | Mod: PBBFAC,,, | Performed by: UROLOGY

## 2022-11-17 PROCEDURE — 3074F PR MOST RECENT SYSTOLIC BLOOD PRESSURE < 130 MM HG: ICD-10-PCS | Mod: CPTII,S$GLB,, | Performed by: UROLOGY

## 2022-11-17 PROCEDURE — 99204 PR OFFICE/OUTPT VISIT, NEW, LEVL IV, 45-59 MIN: ICD-10-PCS | Mod: S$GLB,,, | Performed by: UROLOGY

## 2022-11-17 PROCEDURE — 99204 OFFICE O/P NEW MOD 45 MIN: CPT | Mod: S$GLB,,, | Performed by: UROLOGY

## 2022-11-17 PROCEDURE — 1160F RVW MEDS BY RX/DR IN RCRD: CPT | Mod: CPTII,S$GLB,, | Performed by: UROLOGY

## 2022-11-17 PROCEDURE — 99999 PR PBB SHADOW E&M-EST. PATIENT-LVL IV: ICD-10-PCS | Mod: PBBFAC,,, | Performed by: UROLOGY

## 2022-11-17 PROCEDURE — 1160F PR REVIEW ALL MEDS BY PRESCRIBER/CLIN PHARMACIST DOCUMENTED: ICD-10-PCS | Mod: CPTII,S$GLB,, | Performed by: UROLOGY

## 2022-11-17 PROCEDURE — 3074F SYST BP LT 130 MM HG: CPT | Mod: CPTII,S$GLB,, | Performed by: UROLOGY

## 2022-11-17 PROCEDURE — 1159F PR MEDICATION LIST DOCUMENTED IN MEDICAL RECORD: ICD-10-PCS | Mod: CPTII,S$GLB,, | Performed by: UROLOGY

## 2022-11-17 PROCEDURE — 3008F PR BODY MASS INDEX (BMI) DOCUMENTED: ICD-10-PCS | Mod: CPTII,S$GLB,, | Performed by: UROLOGY

## 2022-11-17 RX ORDER — TADALAFIL 20 MG/1
20 TABLET ORAL DAILY
Qty: 10 TABLET | Refills: 11 | Status: SHIPPED | OUTPATIENT
Start: 2022-11-17

## 2022-11-17 NOTE — PROGRESS NOTES
CHIEF COMPLAINT:    Mr. Denton is a 47 y.o. male presenting for a consultation at the request of Dr. Tesfaye. Patient presents with ED.    PRESENTING ILLNESS:    Last Denton is a 47 y.o. male who c/o ED.  He's never tried a PDE-5i.  He reports a significant amount of anxiety regarding his ED.      REVIEW OF SYSTEMS:    Last Denton denies headache, blurred vision, fever, nausea, vomiting, chills, abdominal pain, chest pain, sore throat, bleeding per rectum, cough, SOB, recent loss of consciousness, recent mental status changes, seizures, dizziness, or upper or lower extremity weakness.    CESAR  1. 1  2. 2  3. 1  4. 1  5. 1      PATIENT HISTORY:    Past Medical History:   Diagnosis Date    Childhood asthma     Hepatitis C antibody test positive, RNA Negative     appears he cleared virus on his own w/o treatment       Past Surgical History:   Procedure Laterality Date    carpel tunnel         Family History   Problem Relation Age of Onset    Cancer Mother     COPD Mother     Emphysema Mother     Breast cancer Mother     COPD Father     Emphysema Father     Hypertension Father     Seizures Father        Social History     Socioeconomic History    Marital status: Unknown   Tobacco Use    Smoking status: Former     Packs/day: 0.00     Years: 0.00     Pack years: 0.00     Types: Cigarettes    Smokeless tobacco: Former   Substance and Sexual Activity    Alcohol use: Never    Drug use: Never    Sexual activity: Yes     Partners: Female     Birth control/protection: None       Allergies:  Influenza virus vaccines    Medications:    Current Outpatient Medications:     gabapentin (NEURONTIN) 600 MG tablet, Take 1 tablet (600 mg total) by mouth 3 (three) times daily as needed (pain)., Disp: 270 tablet, Rfl: 1    meloxicam (MOBIC) 15 MG tablet, TAKE 1 TABLET BY MOUTH EVERY DAY AS NEEDED FOR PAIN, Disp: 30 tablet, Rfl: 0    ipratropium (ATROVENT) 42 mcg (0.06 %) nasal spray, SMARTSI Spray(s) Both Nares 3 Times Daily  PRN, Disp: , Rfl:     nicotine (NICOTROL NS) 10 mg/mL Spry, 1 mg by Each Nostril route 5 (five) times daily. (Patient not taking: Reported on 6/30/2022), Disp: 10 mL, Rfl: 5    tadalafiL (CIALIS) 20 MG Tab, Take 1 tablet (20 mg total) by mouth once daily. Take 1 hour before intercourse, Disp: 10 tablet, Rfl: 11    triamcinolone acetonide 0.1% (KENALOG) 0.1 % cream, triamcinolone acetonide 0.1 % topical cream  APPLY A THIN LAYER TO THE AFFECTED AREA(S) BY TOPICAL ROUTE 2 TIMES PER DAY, Disp: , Rfl:     PHYSICAL EXAMINATION:    The patient generally appears in good health, is appropriately interactive, and is in no apparent distress.     Eyes: anicteric sclerae, moist conjunctivae; no lid-lag; PERRLA     HENT: Atraumatic; oropharynx clear with moist mucous membranes and no mucosal ulcerations;normal hard and soft palate.  No evidence of lymphadenopathy.    Neck: Trachea midline.  No thyromegaly.    Skin: No lesions.    Mental: Cooperative with normal affect.  Is oriented to time, place, and person.    Neuro: Grossly intact.    Chest: Normal inspiratory effort.   No accessory muscles.  No audible wheezes.  Respirations symmetric on inspiration and expiration.    Heart: Regular rhythm.      Abdomen:  Soft, non-tender. No masses or organomegaly. Bladder is not palpable. No evidence of flank discomfort. No evidence of inguinal hernia.    Extremities: No clubbing, cyanosis, or edema      LABS:      Lab Results   Component Value Date    PSA 0.46 06/06/2022    PSA 0.28 01/23/2021       IMPRESSION:    Encounter Diagnoses   Name Primary?    Other male erectile dysfunction          PLAN:    1. We discussed the pathophysiology of ED and the role that stress can play.  I do think there is a significant amount of anxiety causing some of his ED.  Recommend that he see a sex therapist.  Information on this was given today.  Will also try Cialis. Side effects discussed.  A new Rx was given.  I expect him to be able to wean himself  off the Cialis once his confidence gets better.     Copy to: Mera

## 2022-11-17 NOTE — LETTER
November 17, 2022        Blair Tesfaye MD  [address]  [address], [address]th Floor  [facility] [address]             [facility] 4th [address]  [address]  [place] [address]  Phone: [phone]   Patient: Last Denton   MR Number: [id number]   YOB: 1975   Date of Visit: 11/17/2022       Dear Dr. Tesfaye:    Thank you for referring Last Denton to me for evaluation. Attached you will find relevant portions of my assessment and plan of care.    If you have questions, please do not hesitate to call me. I look forward to following Last Denton along with you.    Sincerely,      Chilo Apple MD            CC  No Recipients    Enclosure

## 2022-11-21 ENCOUNTER — PATIENT MESSAGE (OUTPATIENT)
Dept: ADMINISTRATIVE | Facility: HOSPITAL | Age: 47
End: 2022-11-21
Payer: COMMERCIAL

## 2023-03-03 DIAGNOSIS — M25.542 ARTHRALGIA OF BOTH HANDS: ICD-10-CM

## 2023-03-03 DIAGNOSIS — M79.641 PAIN IN BOTH HANDS: ICD-10-CM

## 2023-03-03 DIAGNOSIS — M25.541 ARTHRALGIA OF BOTH HANDS: ICD-10-CM

## 2023-03-03 DIAGNOSIS — M54.2 NECK PAIN: ICD-10-CM

## 2023-03-03 DIAGNOSIS — M79.642 PAIN IN BOTH HANDS: ICD-10-CM

## 2023-03-03 RX ORDER — MELOXICAM 15 MG/1
15 TABLET ORAL DAILY PRN
Qty: 30 TABLET | Refills: 0 | Status: SHIPPED | OUTPATIENT
Start: 2023-03-03 | End: 2023-05-01

## 2023-03-03 RX ORDER — GABAPENTIN 600 MG/1
600 TABLET ORAL 3 TIMES DAILY PRN
Qty: 270 TABLET | Refills: 1 | Status: SHIPPED | OUTPATIENT
Start: 2023-03-03

## 2023-03-03 NOTE — TELEPHONE ENCOUNTER
----- Message from Shaniqua Ramírez sent at 3/3/2023  9:58 AM CST -----  Contact: Patient [phone]  Requesting an RX refill or new RX.  Is this a refill or new RX: new  RX name and strength (copy/paste from chart):  meloxicam (MOBIC) 15 MG tablet  Is this a 30 day or 90 day RX:   Pharmacy name and phone # (copy/paste from chart):  [id number] [place] - [address]  [address]  [place] [address]  Phone: [phone] Fax: [phone]    The doctors have asked that we provide their patients with the following 2 reminders -- prescription refills can take up to 72 hours, and a friendly reminder that in the future you can use your [facility] account to request refills: yes    Requesting an RX refill or new RX.  Is this a refill or new RX: new  RX name and strength (copy/paste from chart):  gabapentin (NEURONTIN) 600 MG tablet  Is this a 30 day or 90 day RX:   Pharmacy name and phone # (copy/paste from chart):    [id number] - [place] - [address]  [address]  [place] [address]  Phone: [phone] Fax: [phone]    The doctors have asked that we provide their patients with the following 2 reminders -- prescription refills can take up to 72 hours, and a friendly reminder that in the future you can use your [facility] account to request refills: yes

## 2023-05-01 DIAGNOSIS — M25.542 ARTHRALGIA OF BOTH HANDS: ICD-10-CM

## 2023-05-01 DIAGNOSIS — M79.641 PAIN IN BOTH HANDS: ICD-10-CM

## 2023-05-01 DIAGNOSIS — M79.642 PAIN IN BOTH HANDS: ICD-10-CM

## 2023-05-01 DIAGNOSIS — M25.541 ARTHRALGIA OF BOTH HANDS: ICD-10-CM

## 2023-05-01 RX ORDER — MELOXICAM 15 MG/1
TABLET ORAL
Qty: 30 TABLET | Refills: 0 | Status: SHIPPED | OUTPATIENT
Start: 2023-05-01 | End: 2023-05-22 | Stop reason: SDUPTHER

## 2023-05-22 DIAGNOSIS — M25.542 ARTHRALGIA OF BOTH HANDS: ICD-10-CM

## 2023-05-22 DIAGNOSIS — M79.641 PAIN IN BOTH HANDS: ICD-10-CM

## 2023-05-22 DIAGNOSIS — M79.642 PAIN IN BOTH HANDS: ICD-10-CM

## 2023-05-22 DIAGNOSIS — M25.541 ARTHRALGIA OF BOTH HANDS: ICD-10-CM

## 2023-05-22 RX ORDER — MELOXICAM 15 MG/1
TABLET ORAL
Qty: 30 TABLET | Refills: 0 | Status: SHIPPED | OUTPATIENT
Start: 2023-05-22 | End: 2023-06-13 | Stop reason: SDUPTHER

## 2023-05-22 NOTE — TELEPHONE ENCOUNTER
----- Message from Bibiana Lopez sent at 5/22/2023 11:36 AM CDT -----  Contact: [phone] Patient  Requesting an RX refill or new RX.  Is this a refill or new RX:   RX name and strength (copy/paste from chart): meloxicam (MOBIC) 15 MG tablet   Is this a 30 day or 90 day RX:   Pharmacy name and phone # (copy/paste from chart):    [id number] - [place] - [address]  [address] [place] [address]  Phone: [phone] Fax: [phone]

## 2023-06-08 ENCOUNTER — TELEPHONE (OUTPATIENT)
Dept: PRIMARY CARE CLINIC | Facility: CLINIC | Age: 48
End: 2023-06-08
Payer: COMMERCIAL

## 2023-06-08 DIAGNOSIS — Z00.00 ANNUAL PHYSICAL EXAM: Primary | ICD-10-CM

## 2023-06-08 DIAGNOSIS — Z13.6 ENCOUNTER FOR LIPID SCREENING FOR CARDIOVASCULAR DISEASE: ICD-10-CM

## 2023-06-08 DIAGNOSIS — Z12.5 SCREENING FOR PROSTATE CANCER: ICD-10-CM

## 2023-06-08 DIAGNOSIS — Z13.220 ENCOUNTER FOR LIPID SCREENING FOR CARDIOVASCULAR DISEASE: ICD-10-CM

## 2023-06-08 DIAGNOSIS — J30.2 SEASONAL ALLERGIES: ICD-10-CM

## 2023-06-08 NOTE — TELEPHONE ENCOUNTER
----- Message from Cyndee Herndon sent at 6/8/2023  9:53 AM CDT -----  Contact: [phone]  Pt would like to have lab orders put in and scheduled at the [place] location before his annual on 06/13. Pt would like an am appt. Please call.            Thank you

## 2023-06-12 ENCOUNTER — LAB VISIT (OUTPATIENT)
Dept: LAB | Facility: HOSPITAL | Age: 48
End: 2023-06-12
Attending: NURSE PRACTITIONER
Payer: COMMERCIAL

## 2023-06-12 DIAGNOSIS — J30.2 SEASONAL ALLERGIES: ICD-10-CM

## 2023-06-12 DIAGNOSIS — Z00.00 ANNUAL PHYSICAL EXAM: ICD-10-CM

## 2023-06-12 DIAGNOSIS — Z12.5 SCREENING FOR PROSTATE CANCER: ICD-10-CM

## 2023-06-12 DIAGNOSIS — Z13.6 ENCOUNTER FOR LIPID SCREENING FOR CARDIOVASCULAR DISEASE: ICD-10-CM

## 2023-06-12 DIAGNOSIS — Z13.220 ENCOUNTER FOR LIPID SCREENING FOR CARDIOVASCULAR DISEASE: ICD-10-CM

## 2023-06-12 LAB
ALBUMIN SERPL BCP-MCNC: 4.1 G/DL (ref 3.5–5.2)
ALP SERPL-CCNC: 71 U/L (ref 55–135)
ALT SERPL W/O P-5'-P-CCNC: 29 U/L (ref 10–44)
ANION GAP SERPL CALC-SCNC: 8 MMOL/L (ref 8–16)
AST SERPL-CCNC: 19 U/L (ref 10–40)
BASOPHILS # BLD AUTO: 0.05 K/UL (ref 0–0.2)
BASOPHILS NFR BLD: 1 % (ref 0–1.9)
BILIRUB SERPL-MCNC: 0.3 MG/DL (ref 0.1–1)
BUN SERPL-MCNC: 22 MG/DL (ref 6–20)
CALCIUM SERPL-MCNC: 9.8 MG/DL (ref 8.7–10.5)
CHLORIDE SERPL-SCNC: 109 MMOL/L (ref 95–110)
CHOLEST SERPL-MCNC: 166 MG/DL (ref 120–199)
CHOLEST/HDLC SERPL: 3.7 {RATIO} (ref 2–5)
CO2 SERPL-SCNC: 25 MMOL/L (ref 23–29)
COMPLEXED PSA SERPL-MCNC: 0.28 NG/ML (ref 0–4)
CREAT SERPL-MCNC: 1.3 MG/DL (ref 0.5–1.4)
DIFFERENTIAL METHOD: NORMAL
EOSINOPHIL # BLD AUTO: 0.1 K/UL (ref 0–0.5)
EOSINOPHIL NFR BLD: 2.2 % (ref 0–8)
ERYTHROCYTE [DISTWIDTH] IN BLOOD BY AUTOMATED COUNT: 13.2 % (ref 11.5–14.5)
EST. GFR  (NO RACE VARIABLE): >60 ML/MIN/1.73 M^2
GLUCOSE SERPL-MCNC: 108 MG/DL (ref 70–110)
HCT VFR BLD AUTO: 47.4 % (ref 40–54)
HDLC SERPL-MCNC: 45 MG/DL (ref 40–75)
HDLC SERPL: 27.1 % (ref 20–50)
HGB BLD-MCNC: 15.3 G/DL (ref 14–18)
IMM GRANULOCYTES # BLD AUTO: 0.01 K/UL (ref 0–0.04)
IMM GRANULOCYTES NFR BLD AUTO: 0.2 % (ref 0–0.5)
LDLC SERPL CALC-MCNC: 80.6 MG/DL (ref 63–159)
LYMPHOCYTES # BLD AUTO: 1.2 K/UL (ref 1–4.8)
LYMPHOCYTES NFR BLD: 24.8 % (ref 18–48)
MCH RBC QN AUTO: 29.4 PG (ref 27–31)
MCHC RBC AUTO-ENTMCNC: 32.3 G/DL (ref 32–36)
MCV RBC AUTO: 91 FL (ref 82–98)
MONOCYTES # BLD AUTO: 0.4 K/UL (ref 0.3–1)
MONOCYTES NFR BLD: 8.6 % (ref 4–15)
NEUTROPHILS # BLD AUTO: 3.2 K/UL (ref 1.8–7.7)
NEUTROPHILS NFR BLD: 63.2 % (ref 38–73)
NONHDLC SERPL-MCNC: 121 MG/DL
NRBC BLD-RTO: 0 /100 WBC
PLATELET # BLD AUTO: 269 K/UL (ref 150–450)
PMV BLD AUTO: 10.8 FL (ref 9.2–12.9)
POTASSIUM SERPL-SCNC: 4.6 MMOL/L (ref 3.5–5.1)
PROT SERPL-MCNC: 7.4 G/DL (ref 6–8.4)
RBC # BLD AUTO: 5.21 M/UL (ref 4.6–6.2)
SODIUM SERPL-SCNC: 142 MMOL/L (ref 136–145)
TRIGL SERPL-MCNC: 202 MG/DL (ref 30–150)
TSH SERPL DL<=0.005 MIU/L-ACNC: 1.28 UIU/ML (ref 0.4–4)
WBC # BLD AUTO: 5.01 K/UL (ref 3.9–12.7)

## 2023-06-12 PROCEDURE — 80061 LIPID PANEL: CPT | Performed by: NURSE PRACTITIONER

## 2023-06-12 PROCEDURE — 36415 COLL VENOUS BLD VENIPUNCTURE: CPT | Performed by: NURSE PRACTITIONER

## 2023-06-12 PROCEDURE — 84443 ASSAY THYROID STIM HORMONE: CPT | Performed by: NURSE PRACTITIONER

## 2023-06-12 PROCEDURE — 80053 COMPREHEN METABOLIC PANEL: CPT | Performed by: NURSE PRACTITIONER

## 2023-06-12 PROCEDURE — 85025 COMPLETE CBC W/AUTO DIFF WBC: CPT | Performed by: NURSE PRACTITIONER

## 2023-06-12 PROCEDURE — 84153 ASSAY OF PSA TOTAL: CPT | Performed by: NURSE PRACTITIONER

## 2023-06-13 ENCOUNTER — OFFICE VISIT (OUTPATIENT)
Dept: PRIMARY CARE CLINIC | Facility: CLINIC | Age: 48
End: 2023-06-13
Payer: COMMERCIAL

## 2023-06-13 VITALS
HEART RATE: 86 BPM | OXYGEN SATURATION: 99 % | SYSTOLIC BLOOD PRESSURE: 124 MMHG | RESPIRATION RATE: 17 BRPM | DIASTOLIC BLOOD PRESSURE: 78 MMHG | BODY MASS INDEX: 32.68 KG/M2 | WEIGHT: 275.56 LBS

## 2023-06-13 DIAGNOSIS — M79.641 PAIN IN BOTH HANDS: ICD-10-CM

## 2023-06-13 DIAGNOSIS — G89.29 CHRONIC NECK PAIN: ICD-10-CM

## 2023-06-13 DIAGNOSIS — Z13.6 ENCOUNTER FOR LIPID SCREENING FOR CARDIOVASCULAR DISEASE: ICD-10-CM

## 2023-06-13 DIAGNOSIS — Z13.1 SCREENING FOR DIABETES MELLITUS: ICD-10-CM

## 2023-06-13 DIAGNOSIS — M79.642 PAIN IN BOTH HANDS: ICD-10-CM

## 2023-06-13 DIAGNOSIS — M54.2 CHRONIC NECK PAIN: ICD-10-CM

## 2023-06-13 DIAGNOSIS — Z12.12 SCREENING FOR COLORECTAL CANCER: ICD-10-CM

## 2023-06-13 DIAGNOSIS — Z12.5 SCREENING FOR PROSTATE CANCER: ICD-10-CM

## 2023-06-13 DIAGNOSIS — M25.542 ARTHRALGIA OF BOTH HANDS: ICD-10-CM

## 2023-06-13 DIAGNOSIS — Z12.11 SCREENING FOR COLORECTAL CANCER: ICD-10-CM

## 2023-06-13 DIAGNOSIS — J30.2 SEASONAL ALLERGIES: ICD-10-CM

## 2023-06-13 DIAGNOSIS — Z13.220 ENCOUNTER FOR LIPID SCREENING FOR CARDIOVASCULAR DISEASE: ICD-10-CM

## 2023-06-13 DIAGNOSIS — Z00.00 ANNUAL PHYSICAL EXAM: Primary | ICD-10-CM

## 2023-06-13 DIAGNOSIS — M25.541 ARTHRALGIA OF BOTH HANDS: ICD-10-CM

## 2023-06-13 PROCEDURE — 99396 PR PREVENTIVE VISIT,EST,40-64: ICD-10-PCS | Mod: S$GLB,,, | Performed by: NURSE PRACTITIONER

## 2023-06-13 PROCEDURE — 99999 PR PBB SHADOW E&M-EST. PATIENT-LVL III: CPT | Mod: PBBFAC,,, | Performed by: NURSE PRACTITIONER

## 2023-06-13 PROCEDURE — 3078F DIAST BP <80 MM HG: CPT | Mod: CPTII,S$GLB,, | Performed by: NURSE PRACTITIONER

## 2023-06-13 PROCEDURE — 99999 PR PBB SHADOW E&M-EST. PATIENT-LVL III: ICD-10-PCS | Mod: PBBFAC,,, | Performed by: NURSE PRACTITIONER

## 2023-06-13 PROCEDURE — 1159F MED LIST DOCD IN RCRD: CPT | Mod: CPTII,S$GLB,, | Performed by: NURSE PRACTITIONER

## 2023-06-13 PROCEDURE — 1160F PR REVIEW ALL MEDS BY PRESCRIBER/CLIN PHARMACIST DOCUMENTED: ICD-10-PCS | Mod: CPTII,S$GLB,, | Performed by: NURSE PRACTITIONER

## 2023-06-13 PROCEDURE — 3074F SYST BP LT 130 MM HG: CPT | Mod: CPTII,S$GLB,, | Performed by: NURSE PRACTITIONER

## 2023-06-13 PROCEDURE — 3008F PR BODY MASS INDEX (BMI) DOCUMENTED: ICD-10-PCS | Mod: CPTII,S$GLB,, | Performed by: NURSE PRACTITIONER

## 2023-06-13 PROCEDURE — 3074F PR MOST RECENT SYSTOLIC BLOOD PRESSURE < 130 MM HG: ICD-10-PCS | Mod: CPTII,S$GLB,, | Performed by: NURSE PRACTITIONER

## 2023-06-13 PROCEDURE — 3008F BODY MASS INDEX DOCD: CPT | Mod: CPTII,S$GLB,, | Performed by: NURSE PRACTITIONER

## 2023-06-13 PROCEDURE — 1160F RVW MEDS BY RX/DR IN RCRD: CPT | Mod: CPTII,S$GLB,, | Performed by: NURSE PRACTITIONER

## 2023-06-13 PROCEDURE — 3078F PR MOST RECENT DIASTOLIC BLOOD PRESSURE < 80 MM HG: ICD-10-PCS | Mod: CPTII,S$GLB,, | Performed by: NURSE PRACTITIONER

## 2023-06-13 PROCEDURE — 1159F PR MEDICATION LIST DOCUMENTED IN MEDICAL RECORD: ICD-10-PCS | Mod: CPTII,S$GLB,, | Performed by: NURSE PRACTITIONER

## 2023-06-13 PROCEDURE — 99396 PREV VISIT EST AGE 40-64: CPT | Mod: S$GLB,,, | Performed by: NURSE PRACTITIONER

## 2023-06-13 RX ORDER — MELOXICAM 15 MG/1
TABLET ORAL
Qty: 90 TABLET | Refills: 0 | Status: SHIPPED | OUTPATIENT
Start: 2023-06-13 | End: 2023-09-22 | Stop reason: SDUPTHER

## 2023-06-13 NOTE — PROGRESS NOTES
[facility] Note    Chief Complaint      Chief Complaint   Patient presents with    Annual Exam     History of Present Illness      Last Denton is a 47 y.o. male patient with chronic conditions of seasonal allergy, chronic neck pain who presents today for annual visit exam.  Patient feeling well no complaints, denies shortness of breath or chest pain, reviewed meds and history patient.  No concerns of bowel or bladder dysfunction, stays hydrated and active.  Owns plumbing co.     Comes to appointment alone  Practice safety measures such as wearing his seatbelt      Labs- orders  Eye exams-glasses  Colonoscopy- due      Vaccines:  COVID- doesn't get  Tdap-2017  Flu shot-    New problem:  none      Health Maintenance   Topic Date Due    TETANUS VACCINE  01/01/2027    Lipid Panel  06/12/2028    Hepatitis C Screening  Completed       Past Medical History:   Diagnosis Date    Childhood asthma     Hepatitis C antibody test positive, RNA Negative     appears he cleared virus on his own w/o treatment       Past Surgical History:   Procedure Laterality Date    carpel tunnel         family history includes Breast cancer in his mother; COPD in his father and mother; Cancer in his mother; Emphysema in his father and mother; Hypertension in his father; Seizures in his father.     Social History     Tobacco Use    Smoking status: Former     Packs/day: 0.00     Years: 0.00     Pack years: 0.00     Types: Cigarettes    Smokeless tobacco: Former   Substance Use Topics    Alcohol use: Never    Drug use: Never       Review of Systems   Constitutional:  Negative for chills and fever.   HENT:  Negative for congestion, sinus pain and sore throat.    Eyes:  Negative for blurred vision.   Respiratory:  Negative for cough, shortness of breath and wheezing.    Cardiovascular:  Negative for chest pain, palpitations and leg swelling.   Gastrointestinal:  Negative for abdominal pain, constipation, diarrhea, nausea and  vomiting.   Genitourinary:  Negative for dysuria.   Musculoskeletal:  Negative for myalgias.   Skin:  Negative for rash.   Neurological:  Negative for dizziness, weakness and headaches.   Psychiatric/Behavioral:  Negative for depression. The patient is not nervous/anxious.       Outpatient Encounter Medications as of 2023   Medication Sig Dispense Refill    gabapentin (NEURONTIN) 600 MG tablet Take 1 tablet (600 mg total) by mouth 3 (three) times daily as needed (pain). 270 tablet 1    ipratropium (ATROVENT) 42 mcg (0.06 %) nasal spray SMARTSI Spray(s) Both Nares 3 Times Daily PRN      tadalafiL (CIALIS) 20 MG Tab Take 1 tablet (20 mg total) by mouth once daily. Take 1 hour before intercourse 10 tablet 11    triamcinolone acetonide 0.1% (KENALOG) 0.1 % cream triamcinolone acetonide 0.1 % topical cream   APPLY A THIN LAYER TO THE AFFECTED AREA(S) BY TOPICAL ROUTE 2 TIMES PER DAY      [DISCONTINUED] meloxicam (MOBIC) 15 MG tablet TAKE 1 TABLET BY MOUTH EVERY DAY AS NEEDED FOR PAIN 30 tablet 0    meloxicam (MOBIC) 15 MG tablet TAKE 1 TABLET BY MOUTH EVERY DAY AS NEEDED FOR PAIN 90 tablet 0    [DISCONTINUED] nicotine (NICOTROL NS) 10 mg/mL Spry 1 mg by Each Nostril route 5 (five) times daily. (Patient not taking: Reported on 2022) 10 mL 5     No facility-administered encounter medications on file as of 2023.       Review of patient's allergies indicates:   Allergen Reactions    Influenza virus vaccines Swelling     Throat Swelling       Physical Exam      Vital Signs  Pulse: 86  Resp: 17  SpO2: 99 %  BP: 124/78  BP Location: Right arm  Patient Position: Sitting  Height and Weight  Weight: 125 kg (275 lb 9.2 oz)    Physical Exam  Vitals and nursing note reviewed.   Constitutional:       General: He is not in acute distress.     Appearance: Normal appearance. He is well-developed. He is not ill-appearing.   HENT:      Head: Normocephalic and atraumatic.      Right Ear: External ear normal.      Left Ear:  External ear normal.   Eyes:      Conjunctiva/sclera: Conjunctivae normal.      Pupils: Pupils are equal, round, and reactive to light.   Neck:      Thyroid: No thyromegaly.      Vascular: No JVD.      Trachea: No tracheal deviation.   Cardiovascular:      Rate and Rhythm: Normal rate and regular rhythm.      Heart sounds: Normal heart sounds.   Pulmonary:      Effort: Pulmonary effort is normal. No respiratory distress.      Breath sounds: Normal breath sounds.   Abdominal:      General: Bowel sounds are normal. There is no distension.      Palpations: Abdomen is soft.      Tenderness: There is no abdominal tenderness.   Musculoskeletal:         General: Normal range of motion.      Cervical back: Normal range of motion and neck supple.   Lymphadenopathy:      Cervical: No cervical adenopathy.   Skin:     General: Skin is warm and dry.      Coloration: Skin is not pale.      Findings: No erythema or rash.   Neurological:      General: No focal deficit present.      Mental Status: He is alert and oriented to person, place, and time.   Psychiatric:         Mood and Affect: Mood normal.         Behavior: Behavior normal.         Thought Content: Thought content normal.         Judgment: Judgment normal.        Laboratory:  CBC:  Lab Results   Component Value Date    WBC 5.01 06/12/2023    RBC 5.21 06/12/2023    HGB 15.3 06/12/2023    HCT 47.4 06/12/2023     06/12/2023    MCV 91 06/12/2023    MCH 29.4 06/12/2023    MCHC 32.3 06/12/2023    MCHC 33.8 06/06/2022    MCHC 32.2 01/23/2021     CMP:  Lab Results   Component Value Date     06/12/2023    CALCIUM 9.8 06/12/2023    ALBUMIN 4.1 06/12/2023    PROT 7.4 06/12/2023     06/12/2023    K 4.6 06/12/2023    CO2 25 06/12/2023     06/12/2023    BUN 22 (H) 06/12/2023    ALKPHOS 71 06/12/2023    ALT 29 06/12/2023    AST 19 06/12/2023    BILITOT 0.3 06/12/2023    BILITOT 0.6 06/06/2022    BILITOT 0.5 01/23/2021     URINALYSIS:  No results found for:  COLORU, CLARITYU, SPECGRAV, PHUR, PROTEINUA, GLUCOSEU, BILIRUBINCON, BLOODU, WBCU, RBCU, BACTERIA, MUCUS, NITRITE, LEUKOCYTESUR, UROBILINOGEN, HYALINECASTS   LIPIDS:  Lab Results   Component Value Date    TSH 1.285 06/12/2023    TSH 0.985 06/06/2022    TSH 0.830 01/23/2021    HDL 45 06/12/2023    HDL 51 06/06/2022    HDL 53 01/23/2021    CHOL 166 06/12/2023    CHOL 160 06/06/2022    CHOL 156 01/23/2021    TRIG 202 (H) 06/12/2023    TRIG 61 06/06/2022    TRIG 53 01/23/2021    LDLCALC 80.6 06/12/2023    LDLCALC 96.8 06/06/2022    LDLCALC 92.4 01/23/2021    CHOLHDL 27.1 06/12/2023    CHOLHDL 31.9 06/06/2022    CHOLHDL 34.0 01/23/2021    NONHDLCHOL 121 06/12/2023    NONHDLCHOL 109 06/06/2022    NONHDLCHOL 103 01/23/2021    TOTALCHOLEST 3.7 06/12/2023    TOTALCHOLEST 3.1 06/06/2022    TOTALCHOLEST 2.9 01/23/2021     TSH:  Lab Results   Component Value Date    TSH 1.285 06/12/2023    TSH 0.985 06/06/2022    TSH 0.830 01/23/2021     A1C:  No results found for: HGBA1C      Assessment/Plan     Last Denton is a 47 y.o.male with:    Annual physical exam    Arthralgia of both hands  -     meloxicam (MOBIC) 15 MG tablet; TAKE 1 TABLET BY MOUTH EVERY DAY AS NEEDED FOR PAIN  Dispense: 90 tablet; Refill: 0    Pain in both hands  -     meloxicam (MOBIC) 15 MG tablet; TAKE 1 TABLET BY MOUTH EVERY DAY AS NEEDED FOR PAIN  Dispense: 90 tablet; Refill: 0    Chronic neck pain    Seasonal allergies    Screening for diabetes mellitus    Encounter for lipid screening for cardiovascular disease    Screening for prostate cancer    Screening for colorectal cancer  -     Ambulatory referral/consult to Endo Procedure ; Future; Expected date: 06/13/2023     All stable at this time  Continue meloxicam as needed for chronic pain  Stay hydrated with water and electrolytes to flush kidneys    Health Maintenance Due   Topic Date Due    COVID-19 Vaccine (1) Never done    Hemoglobin A1c (Diabetic Prevention Screening)  Never done     Colorectal Cancer Screening  Never done          I spent 32 minutes on the day of this encounter for preparing for, evaluating, treating, and managing this patient.        -Continue current medications and maintain follow up with specialists.  Return to clinic in 1 year or sooner for any concerns   No follow-ups on file.      LUANN MatthewsC  [facility]

## 2023-09-22 DIAGNOSIS — M25.542 ARTHRALGIA OF BOTH HANDS: ICD-10-CM

## 2023-09-22 DIAGNOSIS — M79.642 PAIN IN BOTH HANDS: ICD-10-CM

## 2023-09-22 DIAGNOSIS — M79.641 PAIN IN BOTH HANDS: ICD-10-CM

## 2023-09-22 DIAGNOSIS — M25.541 ARTHRALGIA OF BOTH HANDS: ICD-10-CM

## 2023-09-22 RX ORDER — MELOXICAM 15 MG/1
TABLET ORAL
Qty: 90 TABLET | Refills: 0 | Status: SHIPPED | OUTPATIENT
Start: 2023-09-22

## 2023-12-01 ENCOUNTER — TELEPHONE (OUTPATIENT)
Dept: PRIMARY CARE CLINIC | Facility: CLINIC | Age: 48
End: 2023-12-01
Payer: COMMERCIAL

## 2023-12-01 NOTE — TELEPHONE ENCOUNTER
----- Message from Cheryl Byrnes sent at 12/1/2023  9:21 AM CST -----  Contact: Rachelle,Wife/[phone]  [email or web address]     Patient is calling for Medical Advice regarding: pt is requesting zpack due to being sick. States symptoms are chills and body aches, sneezing and coughing. Please advise.     How long has patient had these symptoms: 2 days    Pharmacy name and phone#:  [id number] - [place] - [address]  [address] [place] [address]  Phone: [phone] Fax: [phone]      Would like response via [employer]:       Comments:

## 2023-12-01 NOTE — TELEPHONE ENCOUNTER
Pt advise provider out and the office doesn't have any opening. Advised to report to urgent care for testing

## 2025-01-04 ENCOUNTER — OFFICE VISIT (OUTPATIENT)
Dept: URGENT CARE | Facility: CLINIC | Age: 50
End: 2025-01-04

## 2025-01-04 VITALS
TEMPERATURE: 98 F | DIASTOLIC BLOOD PRESSURE: 90 MMHG | BODY MASS INDEX: 32.54 KG/M2 | HEART RATE: 91 BPM | RESPIRATION RATE: 20 BRPM | HEIGHT: 77 IN | WEIGHT: 275.56 LBS | OXYGEN SATURATION: 96 % | SYSTOLIC BLOOD PRESSURE: 142 MMHG

## 2025-01-04 DIAGNOSIS — S61.412A LACERATION OF LEFT HAND WITHOUT FOREIGN BODY, INITIAL ENCOUNTER: Primary | ICD-10-CM

## 2025-01-04 RX ORDER — MUPIROCIN 20 MG/G
OINTMENT TOPICAL 2 TIMES DAILY
Qty: 22 G | Refills: 0 | Status: SHIPPED | OUTPATIENT
Start: 2025-01-04 | End: 2025-01-11

## 2025-01-04 NOTE — PROCEDURES
"Laceration Repair    Date/Time: 2025 2:15 PM    Performed by: Ana Leonardo NP  Authorized by: Ana Leonardo NP  Consent Done: Yes  Consent: Verbal consent obtained.  Risks and benefits: risks, benefits and alternatives were discussed  Consent given by: patient  Patient understanding: patient states understanding of the procedure being performed  Patient consent: the patient's understanding of the procedure matches consent given  Procedure consent: procedure consent matches procedure scheduled  Relevant documents: relevant documents present and verified  Test results: test results available and properly labeled  Site marked: the operative site was marked  Imaging studies: imaging studies not available  Required items: required blood products, implants, devices, and special equipment available  Patient identity confirmed:  and name  Time out: Immediately prior to procedure a "time out" was called to verify the correct patient, procedure, equipment, support staff and site/side marked as required.  Body area: upper extremity  Location details: left hand  Laceration length: 2 cm  Foreign bodies: no foreign bodies  Tendon involvement: none  Nerve involvement: none  Vascular damage: yes (bleeding stopped with pressure)  Anesthesia: local infiltration    Anesthesia:  Local Anesthetic: lidocaine 1% without epinephrine  Anesthetic total: 3 mL    Patient sedated: no  Preparation: Patient was prepped and draped in the usual sterile fashion.  Irrigation solution: saline  Irrigation method: syringe  Amount of cleaning: standard  Debridement: none  Degree of undermining: none  Skin closure: 5-0 nylon  Number of sutures: 5  Technique: simple  Approximation: close  Approximation difficulty: simple  Dressing: antibiotic ointment and non-stick sterile dressing  Patient tolerance: Patient tolerated the procedure well with no immediate complications  Comments: Provided education to pt to cleanse twice daily with antibacterial soap " and cover with antibiotic ointment and nonstick dressing.

## 2025-01-04 NOTE — PROGRESS NOTES
"Subjective:      Patient ID: Last Denton is a 49 y.o. male.    Vitals:  height is 6' 5" (1.956 m) and weight is 125 kg (275 lb 9.2 oz). His oral temperature is 98 °F (36.7 °C). His blood pressure is 142/90 (abnormal) and his pulse is 91. His respiration is 20 and oxygen saturation is 96%.     Chief Complaint: Laceration    48yo male pt reports laceration to top of L hand that he obtained shortly before clinic arrival.  Reports that he is a  and was attempting to remove a toilet bowel when it broke in his hand, cutting the top of his hand near his middle finger.  Reports cleaning out the wound, no foreign matter or debris.  Reports bleeding that stopped with pressure.  Denies swelling, denies numbness or tingling to hand, denies limitations to ROM.  Last Tdap in 2017.    Laceration   The incident occurred 1 to 3 hours ago. The laceration is 2 cm in size. The laceration mechanism was a broken glass. The pain is at a severity of 0/10. The patient is experiencing no pain. The pain has been Constant since onset. It is unknown if a foreign body is present. His tetanus status is out of date.     Musculoskeletal:  Positive for pain and trauma. Negative for joint pain, joint swelling and abnormal ROM of joint.   Skin:  Positive for wound and laceration. Negative for erythema.   Neurological:  Negative for numbness and tingling.      Objective:     Physical Exam   Constitutional: He is oriented to person, place, and time. He appears well-developed.   HENT:   Head: Normocephalic and atraumatic. Head is without abrasion, without contusion and without laceration.   Ears:   Right Ear: External ear normal.   Left Ear: External ear normal.   Nose: Nose normal.   Mouth/Throat: Oropharynx is clear and moist and mucous membranes are normal.   Eyes: Conjunctivae, EOM and lids are normal. Pupils are equal, round, and reactive to light.   Neck: Trachea normal and phonation normal. Neck supple.   Cardiovascular: Normal rate, " "regular rhythm and normal heart sounds.   Pulmonary/Chest: Effort normal and breath sounds normal. No stridor. No respiratory distress.   Musculoskeletal: Normal range of motion.         General: Normal range of motion.      Left hand: He exhibits laceration. Left middle finger: Exhibits bleeding and tenderness (U-shaped laceration, approx 2cm in length with smooth well-approximated edges, over MCP joint of 3rd digit of L hand, no foreign matter, no swelling or induration).        Hands:    Neurological: He is alert and oriented to person, place, and time.   Skin: Skin is warm, dry, intact and no rash. Capillary refill takes less than 2 seconds. Lacerations - upper ext.:  left handNo abrasion, No burn, No bruising, No erythema and No ecchymosis   Psychiatric: His speech is normal and behavior is normal. Judgment and thought content normal.   Nursing note and vitals reviewed.          Assessment:     1. Laceration of left hand without foreign body, initial encounter        Plan:     Laceration Repair    Date/Time: 2025 2:15 PM    Performed by: Ana Leonardo NP  Authorized by: Ana Leonardo NP  Consent Done: Yes  Consent: Verbal consent obtained.  Risks and benefits: risks, benefits and alternatives were discussed  Consent given by: patient  Patient understanding: patient states understanding of the procedure being performed  Patient consent: the patient's understanding of the procedure matches consent given  Procedure consent: procedure consent matches procedure scheduled  Relevant documents: relevant documents present and verified  Test results: test results available and properly labeled  Site marked: the operative site was marked  Imaging studies: imaging studies not available  Required items: required blood products, implants, devices, and special equipment available  Patient identity confirmed:  and name  Time out: Immediately prior to procedure a "time out" was called to verify the correct patient, " procedure, equipment, support staff and site/side marked as required.  Body area: upper extremity  Location details: left hand  Laceration length: 2 cm  Foreign bodies: no foreign bodies  Tendon involvement: none  Nerve involvement: none  Vascular damage: yes (bleeding stopped with pressure)  Anesthesia: local infiltration    Anesthesia:  Local Anesthetic: lidocaine 1% without epinephrine  Anesthetic total: 3 mL    Patient sedated: no  Preparation: Patient was prepped and draped in the usual sterile fashion.  Irrigation solution: saline  Irrigation method: syringe  Amount of cleaning: standard  Debridement: none  Degree of undermining: none  Skin closure: 5-0 nylon  Number of sutures: 5  Technique: simple  Approximation: close  Approximation difficulty: simple  Dressing: antibiotic ointment and non-stick sterile dressing  Patient tolerance: Patient tolerated the procedure well with no immediate complications  Comments: Provided education to pt to cleanse twice daily with antibacterial soap and cover with antibiotic ointment and nonstick dressing.            Provided education on prescribed medications.  Recommended RTC in 7-10 days for suture removal.  Provided education on return/ER precautions.  Pt verbalized understanding and agreed to plan.      Laceration of left hand without foreign body, initial encounter  -     Tdap Vaccine  -     mupirocin (BACTROBAN) 2 % ointment; Apply topically 2 (two) times daily. for 7 days  Dispense: 22 g; Refill: 0  -     Laceration Repair      Patient Instructions   Laceration Repair     If your condition worsens or fails to improve we recommend that you receive another evaluation at the ER immediately or contact your PCP to discuss your concerns or return here. You must understand that you've received an urgent care treatment only and that you may be released before all your medical problems are known or treated. You the patient will arrange for followup care as instructed.     Use  the prescription antibiotic ointment for 2-3 days only. Clean the wound twice a day with betadiene and apply the ointment. After that, only use a dry bandage as the ointment will keep the laceration too moist.     Suture removal on face in 5 days     Suture removal on trunk or extremities in about 10 days.     Monitor for signs of infection such as redness, drainage, increased swelling or increase pain.     If you were given narcotics for pain do not drive or operate heavy equipment or machinery.     Tylenol or ibuprofen can also be used as directed for pain unless you have an allergy to them or medical condition such as stomach ulcers, kidney or liver disease or blood thinners etc for which you should not be taking these type of medications.

## 2025-01-04 NOTE — PATIENT INSTRUCTIONS
Laceration Repair     If your condition worsens or fails to improve we recommend that you receive another evaluation at the ER immediately or contact your PCP to discuss your concerns or return here. You must understand that you've received an urgent care treatment only and that you may be released before all your medical problems are known or treated. You the patient will arrange for followup care as instructed.     Use the prescription antibiotic ointment for 2-3 days only. Clean the wound twice a day with betadiene and apply the ointment. After that, only use a dry bandage as the ointment will keep the laceration too moist.     Suture removal on face in 5 days     Suture removal on trunk or extremities in about 10 days.     Monitor for signs of infection such as redness, drainage, increased swelling or increase pain.     If you were given narcotics for pain do not drive or operate heavy equipment or machinery.     Tylenol or ibuprofen can also be used as directed for pain unless you have an allergy to them or medical condition such as stomach ulcers, kidney or liver disease or blood thinners etc for which you should not be taking these type of medications.